# Patient Record
Sex: FEMALE
[De-identification: names, ages, dates, MRNs, and addresses within clinical notes are randomized per-mention and may not be internally consistent; named-entity substitution may affect disease eponyms.]

---

## 2017-06-07 ENCOUNTER — HOSPITAL ENCOUNTER (OUTPATIENT)
Dept: HOSPITAL 5 - SPVWC | Age: 62
Discharge: HOME | End: 2017-06-07
Attending: INTERNAL MEDICINE
Payer: COMMERCIAL

## 2017-06-07 DIAGNOSIS — Z12.31: Primary | ICD-10-CM

## 2017-06-07 DIAGNOSIS — F41.9: ICD-10-CM

## 2017-06-07 PROCEDURE — 77067 SCR MAMMO BI INCL CAD: CPT

## 2017-06-07 PROCEDURE — G0202 SCR MAMMO BI INCL CAD: HCPCS

## 2017-06-08 NOTE — MAMMOGRAPHY REPORT
BILATERAL DIGITAL SCREENING MAMMOGRAM with CAD: 06/07/17 10:03:00



CLINICAL: Routine screening.



COMPARISON:06/16/15



FINDINGS: The breasts are almost entirely fatty. No mass, architectural 

distortion or suspicious calcifications.



IMPRESSION: No mammographic evidence of malignancy.



BI-RADS CATEGORY: 1 - - Negative



RECOMMENDATION: Routine mammographic screening in one year.





COMMENT:

Patient follow-up letters are generated by our [employer] application.

## 2017-09-07 ENCOUNTER — HOSPITAL ENCOUNTER (EMERGENCY)
Dept: HOSPITAL 5 - ED | Age: 62
Discharge: HOME | End: 2017-09-07
Payer: COMMERCIAL

## 2017-09-07 VITALS — SYSTOLIC BLOOD PRESSURE: 156 MMHG | DIASTOLIC BLOOD PRESSURE: 76 MMHG

## 2017-09-07 DIAGNOSIS — M19.90: ICD-10-CM

## 2017-09-07 DIAGNOSIS — L23.7: Primary | ICD-10-CM

## 2017-09-07 DIAGNOSIS — K21.9: ICD-10-CM

## 2017-09-07 PROCEDURE — 96372 THER/PROPH/DIAG INJ SC/IM: CPT

## 2017-09-07 PROCEDURE — 99282 EMERGENCY DEPT VISIT SF MDM: CPT

## 2017-09-07 NOTE — EMERGENCY DEPARTMENT REPORT
ED Rash HPI





- HPI


Chief Complaint: Skin Rash


Stated Complaint: ITCHING ALL OVER


Time Seen by Provider: 09/07/17 10:07


Duration: 3 Days


Location: Chest, Abdomen, Upper Extremities


Suspected Cause: Other (rash)


Rash Symptoms: Yes Itching, Yes Blistering, No Facial Swelling, No Tongue/Oral 

Swelling, No Breathing Difficulties, No Choking Sensation, No Wheezing/Dyspnea, 

No Peeling, No Fever, No Lightheaded, No Malaise, No Myalgias


Severity: mild





ED Review of Systems


ROS: 


Stated complaint: ITCHING ALL OVER


Other details as noted in HPI





Comment: All other systems reviewed and negative


Constitutional: no symptoms reported, see HPI.  denies: chills


Eyes: as per HPI.  denies: eye pain


ENT: as per HPI.  denies: ear pain, throat pain


Respiratory: no symptoms reported, see HPI.  denies: cough, orthopnea


Cardiovascular: as per HPI.  denies: chest pain, palpitations, dyspnea on 

exertion, orthopnea


Endocrine: no symptoms reported, see HPI.  denies: excessive sweating, flushing


Gastrointestinal: as per HPI.  denies: abdominal pain, nausea, vomiting


Genitourinary: as per HPI.  denies: urgency, dysuria


Musculoskeletal: as per HPI.  denies: back pain


Skin: as per HPI, rash, pruritus.  denies: lesions


Neurological: as per HPI.  denies: headache, weakness


Psychiatric: as per HPI.  denies: anxiety, depression


Hematological/Lymphatic: as per HPI.  denies: easy bleeding





ED Past Medical Hx





- Past Medical History


Previous Medical History?: Yes


Hx GERD: Yes


Hx Arthritis: Yes





- Surgical History


Past Surgical History?: Yes


Additional Surgical History: LUMP REMOVAL





- Social History


Smoking Status: Never Smoker


Substance Use Type: None





- Medications


Home Medications: 


 Home Medications











 Medication  Instructions  Recorded  Confirmed  Last Taken  Type


 


HYDROcodone/APAP 5-325 [[place] 1 - 2 each PO Q4HR PRN #30 tablet 07/09/14  

Unknown Rx





5/325]     


 


traMADol [Ultram] 50 mg PO Q4HR PRN #20 tablet 10/14/16  Unknown Rx


 


methylPREDNISolone [Medrol] 4 mg PO DAILY #1 tab.ds.pk 09/07/17  Unknown Rx














Rash Exam





- Exam


General: 


Vital signs noted. No distress. Alert and acting appropriately.





HEENT: No Periorbital Edema, No Conjuctival Injection, No Chemosis, No Perioral 

Edema, No Tongue Edema, No Uvular Edema, No Compromised Airway, No Drooling


Lungs: Yes Good Air Exchange, No Wheezes, No Ronchi, No Stridor, No Cough, No 

Labored Respirations, No Retractions, No Use of Accessory Muscles, No Other 

Abnormal Lung Sounds


Heart: Yes Regular, No Murmur


Skin: Yes Maculopapular Rash, No Urticarial Rash, No Morbilliform rash, No Bulla

(e), No Excoriations, No Weeping, No Tenderness, No Erythema, No Edema, No 

Encrustations


Other: Positive: Abdomen Normal, Neurologic Normal, Musculoskeletal Normal





ED Course


 Vital Signs











  09/07/17





  09:54


 


Temperature 98.3 F


 


Pulse Rate 72


 


Respiratory 16





Rate 


 


Blood Pressure 156/76


 


O2 Sat by Pulse 98





Oximetry 














- Reevaluation(s)


Reevaluation #1: 





09/07/17 


rash p working in yard a few days ago





ED Medical Decision Making





- Medical Decision Making





rash p working in yard


multiple areas


consistent w poison ivy


Critical care attestation.: 


If time is entered above; I have spent that time in minutes in the direct care 

of this critically ill patient, excluding procedure time.








ED Disposition


Clinical Impression: 


 Poison ivy





Disposition: DC-01 TO HOME OR SELFCARE


Is pt being admited?: No


Does the pt Need Aspirin: No


Condition: Stable


Instructions:  Poison Ivy (ED)


Additional Instructions: 


do not scratch





benadryl over the counter





calamine is fine for comfort





pepcid 20 mg daily for 5 days for the itching will also help





cooll compresses


Prescriptions: 


methylPREDNISolone [Medrol] 4 mg PO DAILY #1 tab.ds.pk


Referrals: 


HARSHIL SOTELO MD [Primary Care Provider] - 3-5 Days


NATALIYA FINNEGAN MD [Staff Physician] - 3-5 Days


Time of Disposition: 10:15

## 2018-06-18 ENCOUNTER — HOSPITAL ENCOUNTER (OUTPATIENT)
Dept: HOSPITAL 5 - SPVWC | Age: 63
Discharge: HOME | End: 2018-06-18
Attending: INTERNAL MEDICINE
Payer: COMMERCIAL

## 2018-06-18 DIAGNOSIS — Z12.31: Primary | ICD-10-CM

## 2018-06-18 PROCEDURE — 77067 SCR MAMMO BI INCL CAD: CPT

## 2019-06-18 ENCOUNTER — HOSPITAL ENCOUNTER (OUTPATIENT)
Dept: HOSPITAL 5 - SPVWC | Age: 64
Discharge: HOME | End: 2019-06-18
Attending: INTERNAL MEDICINE
Payer: COMMERCIAL

## 2019-06-18 DIAGNOSIS — K21.9: ICD-10-CM

## 2019-06-18 DIAGNOSIS — Z12.31: Primary | ICD-10-CM

## 2019-06-18 PROCEDURE — 77067 SCR MAMMO BI INCL CAD: CPT

## 2019-06-18 NOTE — MAMMOGRAPHY REPORT
BILATERAL DIGITAL SCREENING MAMMOGRAM with CAD: 06/18/19 09:10:00



CLINICAL: Routine screening.



COMPARISON: 06/08/18 and 06/07/17



FINDINGS: There are bilateral scattered areas of fibroglandular 

density.No mass, architectural distortion or suspicious calcifications.



IMPRESSION: No mammographic evidence of malignancy.



BI-RADS CATEGORY:  1 -- Negative



RECOMMENDATION: Routine mammographic screening in one year.



COMMENT:

Patient follow-up letters are generated by our [employer] application.

## 2019-09-07 ENCOUNTER — HOSPITAL ENCOUNTER (EMERGENCY)
Dept: HOSPITAL 5 - ED | Age: 64
Discharge: HOME | End: 2019-09-07
Payer: COMMERCIAL

## 2019-09-07 VITALS — DIASTOLIC BLOOD PRESSURE: 68 MMHG | SYSTOLIC BLOOD PRESSURE: 133 MMHG
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
## 2019-09-07 DIAGNOSIS — S46.911A: Primary | ICD-10-CM
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
## 2019-09-07 DIAGNOSIS — M75.91: ICD-10-CM

## 2019-09-07 DIAGNOSIS — K21.9: ICD-10-CM

## 2019-09-07 DIAGNOSIS — Z79.899: ICD-10-CM

## 2019-09-07 DIAGNOSIS — Z91.041: ICD-10-CM

## 2019-09-07 DIAGNOSIS — M19.011: ICD-10-CM
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
## 2019-09-07 PROCEDURE — 99282 EMERGENCY DEPT VISIT SF MDM: CPT

## 2019-09-07 NOTE — EMERGENCY DEPARTMENT REPORT
ED Extremity Problem HPI





- General


Chief complaint: Shoulder Injury


Stated complaint: L SHOULDER PAIN


Source: patient


Mode of arrival: Ambulatory


Limitations: No Limitations





- History of Present Illness


Initial comments: 





Patient is a 64-year-old  female with past medical history of chronic 

degenerative joint disease presents to the ED for an acute onset persistent 

right shoulder pain with range of motion, worse in the last 12 hours.  Patient 

states that she described the flow and moved furniture around 2 days ago and 

from then on the right shoulder pain has been worsening.  Patient denies fall, 

traumatic injury, chest pain, neck pain, dizziness, abdominal pain, diaphoresis,

headache, numbness and tingling or weakness of right arm, shortness of breath, 

change in vision or palpitations.


MD Complaint: extremity pain (right shoulder), joint paint (right shoulder)


-: Sudden, days(s) (2)


Location: right, upper extremity (shoulder)


History of Same: Yes


-: Yes arthralgia (chronic polyarticular osteoarthritis), No fever, No 

associated dyspnea, No associated chest pain


Severity scale (0 -10): 7


Quality: aching, sharp, constant


Consistency: constant


Improves with: rest


Worsens with: weight bearing, exertion, palpation


Associated Symptoms: denies other symptoms, arthralgias.  denies: chest pain, 

shortness of breath, fever, myalgias, rash





- Related Data


                                  Previous Rx's











 Medication  Instructions  Recorded  Last Taken  Type


 


HYDROcodone/APAP 5-325 [[place] 1 - 2 each PO Q4HR PRN #30 tablet 07/09/14 Unknown

 Rx





5/325]    


 


traMADol [Ultram] 50 mg PO Q4HR PRN #20 tablet 10/14/16 Unknown Rx


 


methylPREDNISolone [Medrol] 4 mg PO DAILY #1 tab.ds.pk 09/07/17 Unknown Rx


 


Meloxicam [Mobic] 15 mg PO DAILY #30 tablet 09/07/19 Unknown Rx


 


tiZANidine [Zanaflex 4mg TAB] 4 mg PO Q8H PRN #15 tablet 09/07/19 Unknown Rx


 


traMADol [Ultram] 50 mg PO Q6HR PRN #15 tablet 09/07/19 Unknown Rx











                                    Allergies











Allergy/AdvReac Type Severity Reaction Status Date / Time


 


contrast dye AdvReac  Shortness Uncoded 09/07/17 09:59





   of Breath  














ED Review of Systems


ROS: 


Stated complaint: L SHOULDER PAIN


Other details as noted in HPI





Constitutional: denies: chills, fever


Eyes: denies: eye pain, eye discharge, vision change


ENT: denies: ear pain, throat pain


Respiratory: denies: cough, shortness of breath, wheezing


Cardiovascular: denies: chest pain, palpitations


Endocrine: no symptoms reported


Gastrointestinal: denies: abdominal pain, nausea, diarrhea


Genitourinary: denies: urgency, dysuria, discharge


Musculoskeletal: arthralgia (RIGHT HSOULDER), other (RIGHT SHOULDER PAIN).  

denies: back pain, joint swelling


Skin: denies: rash, lesions


Neurological: denies: headache, weakness, paresthesias


Psychiatric: denies: anxiety, depression


Hematological/Lymphatic: denies: easy bleeding, easy bruising





ED Past Medical Hx





- Past Medical History


Previous Medical History?: Yes


Hx GERD: Yes


Hx Arthritis: Yes





- Surgical History


Past Surgical History?: No


Additional Surgical History: LUMP REMOVAL





- Social History


Smoking Status: Unknown if ever smoked


Substance Use Type: None





- Medications


Home Medications: 


                                Home Medications











 Medication  Instructions  Recorded  Confirmed  Last Taken  Type


 


HYDROcodone/APAP 5-325 [[place] 1 - 2 each PO Q4HR PRN #30 tablet 07/09/14  

Unknown Rx





5/325]     


 


traMADol [Ultram] 50 mg PO Q4HR PRN #20 tablet 10/14/16  Unknown Rx


 


methylPREDNISolone [Medrol] 4 mg PO DAILY #1 tab.ds.pk 09/07/17  Unknown Rx


 


Meloxicam [Mobic] 15 mg PO DAILY #30 tablet 09/07/19  Unknown Rx


 


tiZANidine [Zanaflex 4mg TAB] 4 mg PO Q8H PRN #15 tablet 09/07/19  Unknown Rx


 


traMADol [Ultram] 50 mg PO Q6HR PRN #15 tablet 09/07/19  Unknown Rx














ED Physical Exam





- General


Limitations: No Limitations


General appearance: alert, in no apparent distress





- Head


Head exam: Present: atraumatic, normocephalic, normal inspection





- Eye


Eye exam: Present: normal appearance, PERRL, EOMI


Pupils: Present: normal accommodation





- ENT


ENT exam: Present: normal exam, normal orophraynx, mucous membranes moist, TM's 

normal bilaterally, normal external ear exam





- Neck


Neck exam: Present: normal inspection, full ROM.  Absent: tenderness, 

lymphadenopathy





- Respiratory


Respiratory exam: Present: normal lung sounds bilaterally.  Absent: respiratory 

distress, wheezes, stridor, chest wall tenderness





- Cardiovascular


Cardiovascular Exam: Present: regular rate, normal rhythm, normal heart sounds. 

 Absent: systolic murmur, diastolic murmur, rubs, gallop





- GI/Abdominal


GI/Abdominal exam: Present: soft, normal bowel sounds.  Absent: tenderness, 

guarding, rebound, hyperactive bowel sounds, hypoactive bowel sounds, 

organomegaly





- Rectal


Rectal exam: Present: deferred





- Extremities Exam


Extremities exam: Present: normal inspection, tenderness (Right shoulder 

tenderness with limited ROM due to pain), normal capillary refill.  Absent: full

 ROM (due to pain on right shoulder)





- Back Exam


Back exam: Present: normal inspection, full ROM.  Absent: CVA tenderness (R), 

CVA tenderness (L), muscle spasm, paraspinal tenderness, vertebral tenderness





- Neurological Exam


Neurological exam: Present: alert, oriented X3, CN II-XII intact, normal gait, 

reflexes normal





- Psychiatric


Psychiatric exam: Present: normal affect, normal mood





- Skin


Skin exam: Present: warm, dry, intact, normal color.  Absent: rash





ED Course





                                   Vital Signs











  09/07/19 09/07/19 09/07/19





  17:37 19:48 19:51


 


Temperature 98.0 F  


 


Pulse Rate 80  


 


Respiratory 18 18 16





Rate   


 


Blood Pressure 128/65  


 


O2 Sat by Pulse 98  





Oximetry   














- Reevaluation(s)


Reevaluation #1: 





09/07/19 20:25


64-year-old female with a history of chronic osteoarthritis presents to the ED 

with symptomatic pain after scrubbing the floors and lifting furniture at home 2

 days ago.  In the ED, patient is alert and oriented 3 and is in distress with 

normal vital signs.  Patient was treated for pain in the ED and right shoulder 

x-ray shows no acute fractures or subluxations.  Patient's symptoms are likely 

due to tendinitis and osteoarthritis of the right shoulder due to heavy lifting 

and exertion.  Patient was sent home on pain medication and advised to follow-up

 with her primary care physician in 7-10 days for reevaluation or return 

immediately if symptoms get worse.





ED Medical Decision Making





- Radiology Data


Radiology results: report reviewed, image reviewed





Right shoulder x-ray shows no acute fractures or subluxations.





- Medical Decision Making





This is a 64-year-old female who presents to ED with right shoulder pain.  

Shoulder x-ray shows no acute fractures or subluxations.  Patient's pain is wor

se with active range of motion of the right shoulder joint consistent with 

tendinitis or chronic osteoarthritis exacerbation.  Patient was treated in the 

ED and discharged home on pain medications and advised to follow-up with her 

primary care physician in 7-10 days for reevaluation or return to the ED 

immediately if symptoms get worse.





- Differential Diagnosis


chronic osteoarthritis; Right shoulder tendonitis; Muscle strain


Critical care attestation.: 


If time is entered above; I have spent that time in minutes in the direct care 

of this critically ill patient, excluding procedure time.








ED Disposition


Clinical Impression: 


 Right shoulder tendonitis, Primary localized osteoarthrosis of right shoulder





Muscle strain of right shoulder


Qualifiers:


 Encounter type: initial encounter Qualified Code(s): S46.911A - Strain of un

specified muscle, fascia and tendon at shoulder and upper arm level, right arm, 

initial encounter





Disposition: DC-01 TO HOME OR SELFCARE


Is pt being admited?: No


Does the pt Need Aspirin: No


Condition: Stable


Instructions:  Muscle Strain (ED), Osteoarthritis (ED), Tendinitis (ED)


Additional Instructions: 


Take medications with food, drink plenty of fluids and follow-up with your 

Primary Care Physician in 7-10 days for reevaluation.  Return to the ED 

immediately if symptoms get worse.


Prescriptions: 


Meloxicam [Mobic] 15 mg PO DAILY #30 tablet


traMADol [Ultram] 50 mg PO Q6HR PRN #15 tablet


 PRN Reason: Pain


tiZANidine [Zanaflex 4mg TAB] 4 mg PO Q8H PRN #15 tablet


 PRN Reason: Muscle Spasm


Referrals: 


[facility] [Outside] - 3-5 Days


Time of Disposition: 20:15


Print Language: ENGLISH